# Patient Record
Sex: FEMALE | Race: ASIAN | NOT HISPANIC OR LATINO
[De-identification: names, ages, dates, MRNs, and addresses within clinical notes are randomized per-mention and may not be internally consistent; named-entity substitution may affect disease eponyms.]

---

## 2021-03-26 ENCOUNTER — APPOINTMENT (OUTPATIENT)
Dept: CARDIOLOGY | Facility: CLINIC | Age: 66
End: 2021-03-26
Payer: MEDICARE

## 2021-03-26 VITALS
RESPIRATION RATE: 18 BRPM | DIASTOLIC BLOOD PRESSURE: 68 MMHG | WEIGHT: 103 LBS | HEART RATE: 87 BPM | HEIGHT: 62 IN | BODY MASS INDEX: 18.95 KG/M2 | SYSTOLIC BLOOD PRESSURE: 107 MMHG | TEMPERATURE: 98.2 F | OXYGEN SATURATION: 96 %

## 2021-03-26 PROCEDURE — 99214 OFFICE O/P EST MOD 30 MIN: CPT | Mod: 25

## 2021-03-26 PROCEDURE — 93015 CV STRESS TEST SUPVJ I&R: CPT

## 2021-03-26 PROCEDURE — 93306 TTE W/DOPPLER COMPLETE: CPT

## 2021-03-26 NOTE — PHYSICAL EXAM
[General Appearance - Well Developed] : well developed [Normal Appearance] : normal appearance [Well Groomed] : well groomed [General Appearance - Well Nourished] : well nourished [No Deformities] : no deformities [General Appearance - In No Acute Distress] : no acute distress [Normal Conjunctiva] : the conjunctiva exhibited no abnormalities [Eyelids - No Xanthelasma] : the eyelids demonstrated no xanthelasmas [Normal Oral Mucosa] : normal oral mucosa [No Oral Pallor] : no oral pallor [No Oral Cyanosis] : no oral cyanosis [Normal Jugular Venous A Waves Present] : normal jugular venous A waves present [Normal Jugular Venous V Waves Present] : normal jugular venous V waves present [No Jugular Venous Mirza A Waves] : no jugular venous mirza A waves [Respiration, Rhythm And Depth] : normal respiratory rhythm and effort [Exaggerated Use Of Accessory Muscles For Inspiration] : no accessory muscle use [Auscultation Breath Sounds / Voice Sounds] : lungs were clear to auscultation bilaterally [Heart Rate And Rhythm] : heart rate and rhythm were normal [Heart Sounds] : normal S1 and S2 [Murmurs] : no murmurs present [Arterial Pulses Normal] : the arterial pulses were normal [Edema] : no peripheral edema present [Abdomen Soft] : soft [Abdomen Tenderness] : non-tender [Abdomen Mass (___ Cm)] : no abdominal mass palpated [Abnormal Walk] : normal gait [Gait - Sufficient For Exercise Testing] : the gait was sufficient for exercise testing [Nail Clubbing] : no clubbing of the fingernails [Cyanosis, Localized] : no localized cyanosis [Petechial Hemorrhages (___cm)] : no petechial hemorrhages [] : no ischemic changes [Oriented To Time, Place, And Person] : oriented to person, place, and time [Affect] : the affect was normal [Mood] : the mood was normal [No Anxiety] : not feeling anxious

## 2021-04-06 ENCOUNTER — NON-APPOINTMENT (OUTPATIENT)
Age: 66
End: 2021-04-06

## 2021-07-03 NOTE — DISCUSSION/SUMMARY
[FreeTextEntry1] : 59-year-old female with no significant past medical history presents for evaluation of abnormal with ECG and shortness of breath. The patient has no cardiac history. Patient was found to have an abnormal ECG on routine physical, showing Q waves anteriorly. Patient denies chest pain but reports shortness of breath with exertion. She denies palpitations or lightheadedness. She reports history of syncope when she was pregnant. She also reports that she lost 20 pounds over the past year. She was also found to have elevated serum sodium level.\par \par (1) Abnormal ECG, q waves anteriorly, SOB with exertion - Patient underwent a stress echo and completed 10 minutes of Randolph protocol.  There was no ECG or echocardiographic evidence of ischemia.  Patient was reassured.  Her ECG abnormality is likely a normal variant.\par \par (2) Abnormal weight loss - The cause of her weight loss is not clear.  She may need GI evaluation.\par \par (3) Followup - as needed.\par

## 2021-07-03 NOTE — HISTORY OF PRESENT ILLNESS
[FreeTextEntry1] : 66-year-old female with no significant past medical history presents for followup.  \par \par Patient was last seen on 6/10/14 for evaluation of abnormal with ECG and shortness of breath.  Patient underwent a stress echo and completed 10 minutes of Randolph protocol. There was no ECG or echocardiographic evidence of ischemia. Patient was reassured. Her ECG abnormality was felt to be a normal variant.\par

## 2021-07-03 NOTE — REASON FOR VISIT
[Follow-Up - Clinic] : a clinic follow-up of [Abnormal ECG] : an abnormal ECG [Dyspnea] : dyspnea [FreeTextEntry1] : 3/26/21 - Patient reports L upper CP in the mornings, not related to exertion for the last 4-5 months. Patient reports difficulty breathing and SCHMIDT..She has not have recent CXR. Patient reports burping. Patient denies h/o syncope. Patient denies palpitations. I advised patient to undergo an echocardiogram and a treadmill stress test. I advised patient to get CXR. \par \par \par 6/10/14 - The patient has no cardiac history. Patient was found to have an abnormal ECG on routine physical, showing Q waves anteriorly. Patient denies chest pain but reports shortness of breath with exertion. She denies palpitations or lightheadedness. She reports history of syncope when she was pregnant. She also reports that she lost 20 pounds over the past year. She was also found to have elevated serum sodium level.

## 2022-04-29 ENCOUNTER — APPOINTMENT (OUTPATIENT)
Dept: CARDIOLOGY | Facility: CLINIC | Age: 67
End: 2022-04-29
Payer: MEDICARE

## 2022-04-29 ENCOUNTER — NON-APPOINTMENT (OUTPATIENT)
Age: 67
End: 2022-04-29

## 2022-04-29 VITALS
OXYGEN SATURATION: 95 % | DIASTOLIC BLOOD PRESSURE: 72 MMHG | HEART RATE: 71 BPM | TEMPERATURE: 97.8 F | BODY MASS INDEX: 17.92 KG/M2 | SYSTOLIC BLOOD PRESSURE: 116 MMHG | RESPIRATION RATE: 16 BRPM | WEIGHT: 98 LBS

## 2022-04-29 DIAGNOSIS — R06.02 SHORTNESS OF BREATH: ICD-10-CM

## 2022-04-29 PROCEDURE — 93015 CV STRESS TEST SUPVJ I&R: CPT

## 2022-04-29 PROCEDURE — 93000 ELECTROCARDIOGRAM COMPLETE: CPT | Mod: 59

## 2022-04-29 PROCEDURE — ZZZZZ: CPT

## 2022-04-29 PROCEDURE — 93306 TTE W/DOPPLER COMPLETE: CPT

## 2022-04-29 PROCEDURE — 99214 OFFICE O/P EST MOD 30 MIN: CPT | Mod: 25

## 2022-04-30 PROBLEM — R06.02 SHORTNESS OF BREATH: Status: ACTIVE | Noted: 2022-04-29

## 2022-05-01 NOTE — HISTORY OF PRESENT ILLNESS
[FreeTextEntry1] : 4/29/22- Patient reports feeling palpitations with feelings of anxiousness even when she is just doing dishes. Patient did not take pulse at home. Patient reports SOB with exertion. Patient denies CP. I advised patient to undergo an echocardiogram and a treadmill stress test. I advised patient to wear a Holter monitor. \par \par 3/26/21 - Patient reports L upper CP in the mornings, not related to exertion for the last 4-5 months. Patient reports difficulty breathing and SCHMIDT..She has not have recent CXR. Patient reports burping. Patient denies h/o syncope. Patient denies palpitations. I advised patient to undergo an echocardiogram and a treadmill stress test. I advised patient to get CXR. \par \par 6/10/14 - The patient has no cardiac history. Patient was found to have an abnormal ECG on routine physical, showing Q waves anteriorly. Patient denies chest pain but reports shortness of breath with exertion. She denies palpitations or lightheadedness. She reports history of syncope when she was pregnant. She also reports that she lost 20 pounds over the past year. She was also found to have elevated serum sodium level.

## 2022-05-01 NOTE — REASON FOR VISIT
[FreeTextEntry1] : 67-year-old female with no significant past medical history presents for followup.  \par \par Patient was last seen on  3/26/21 for L upper CP in the mornings and SCHMIDT.  I advised patient to undergo an echocardiogram and a treadmill stress test. I advised patient to get CXR.  Patient underwent an echocardiogram and it showed normal LV function without significant valvular pathology.  Patient underwent a treadmill stress test and completed 9 minutes of Randolph protocol.  There was no ECG evidence of ischemia. Following treadmill stress, there was no echocardiographic evidence of ischemia. Patient underwent a CXR and it showed no lung pathology. \par  \par \par \par \par

## 2022-05-01 NOTE — CARDIOLOGY SUMMARY
[___] : [unfilled] [de-identified] : Patient underwent a stress echo 6/10/14 and completed 10 minutes of Randolph protocol. There was no ECG or echocardiographic evidence of ischemia.  \par

## 2022-05-04 ENCOUNTER — NON-APPOINTMENT (OUTPATIENT)
Age: 67
End: 2022-05-04

## 2022-05-05 ENCOUNTER — NON-APPOINTMENT (OUTPATIENT)
Age: 67
End: 2022-05-05

## 2023-03-30 ENCOUNTER — APPOINTMENT (OUTPATIENT)
Dept: CARDIOLOGY | Facility: CLINIC | Age: 68
End: 2023-03-30
Payer: MEDICARE

## 2023-03-30 ENCOUNTER — NON-APPOINTMENT (OUTPATIENT)
Age: 68
End: 2023-03-30

## 2023-03-30 VITALS
WEIGHT: 94 LBS | SYSTOLIC BLOOD PRESSURE: 123 MMHG | BODY MASS INDEX: 17.19 KG/M2 | HEART RATE: 76 BPM | RESPIRATION RATE: 16 BRPM | TEMPERATURE: 96.8 F | DIASTOLIC BLOOD PRESSURE: 78 MMHG | OXYGEN SATURATION: 97 %

## 2023-03-30 DIAGNOSIS — R07.89 OTHER CHEST PAIN: ICD-10-CM

## 2023-03-30 DIAGNOSIS — R01.1 CARDIAC MURMUR, UNSPECIFIED: ICD-10-CM

## 2023-03-30 DIAGNOSIS — F41.9 ANXIETY DISORDER, UNSPECIFIED: ICD-10-CM

## 2023-03-30 DIAGNOSIS — R00.2 PALPITATIONS: ICD-10-CM

## 2023-03-30 PROCEDURE — 99214 OFFICE O/P EST MOD 30 MIN: CPT

## 2023-03-30 PROCEDURE — 93000 ELECTROCARDIOGRAM COMPLETE: CPT

## 2023-03-30 PROCEDURE — 93306 TTE W/DOPPLER COMPLETE: CPT

## 2023-03-30 RX ORDER — PROPRANOLOL HYDROCHLORIDE 10 MG/1
10 TABLET ORAL TWICE DAILY
Qty: 60 | Refills: 1 | Status: ACTIVE | COMMUNITY
Start: 2023-03-30 | End: 1900-01-01

## 2023-03-31 NOTE — CARDIOLOGY SUMMARY
[___] : [unfilled] [de-identified] : Patient underwent a stress echo 6/10/14 and completed 10 minutes of Randolph protocol. There was no ECG or echocardiographic evidence of ischemia.  \par

## 2023-03-31 NOTE — HISTORY OF PRESENT ILLNESS
[FreeTextEntry1] : 3/30/23 - Pt is referred today for murmur. Pt reports occasional extra beats. I advised patient to undergo an echocardiogram.  I advised patient to get CTA . \par \par 4/29/22- Patient reports feeling palpitations with feelings of anxiousness even when she is just doing dishes. Patient did not take pulse at home. Patient reports SOB with exertion. Patient denies CP. I advised patient to undergo an echocardiogram and a treadmill stress test. I advised patient to wear a Holter monitor. \par \par 3/26/21 - Patient reports L upper CP in the mornings, not related to exertion for the last 4-5 months. Patient reports difficulty breathing and SCHMIDT..She has not have recent CXR. Patient reports burping. Patient denies h/o syncope. Patient denies palpitations. I advised patient to undergo an echocardiogram and a treadmill stress test. I advised patient to get CXR. \par \par 6/10/14 - The patient has no cardiac history. Patient was found to have an abnormal ECG on routine physical, showing Q waves anteriorly. Patient denies chest pain but reports shortness of breath with exertion. She denies palpitations or lightheadedness. She reports history of syncope when she was pregnant. She also reports that she lost 20 pounds over the past year. She was also found to have elevated serum sodium level.

## 2023-03-31 NOTE — REASON FOR VISIT
[FreeTextEntry1] : 67-year-old female with no significant past medical history presents for followup.  \par \par Patient was last seen on 4/29/22 for followup.  Patient reported feeling palpitations with feelings of anxiousness even when she was just doing dishes. Patient did not take pulse at home. Patient reported SOB with exertion.  I advised patient to undergo an echocardiogram and a treadmill stress test. I advised patient to wear a Holter monitor.  Patient underwent an echocardiogram and it showed normal LV function without significant valvular pathology.  Patient underwent a treadmill stress test and completed 8 minutes of Randolph protocol.  There was no ECG evidence of ischemia.  Following treadmill stress, there was no echocardiographic evidence of ischemia.  Patient wore a Holter and it showed average HR 65, 2% PACs, couplets and 1 PAT 5 beats at a rate of 140.\par \par She is on ?\par \par Patient underwent an echocardiogram 3/26/21 and it showed normal LV function without significant valvular pathology.  \par \par Patient underwent a treadmill stress test 3/26/21 and completed 9 minutes of Randolph protocol.  There was no ECG evidence of ischemia. Following treadmill stress, there was no echocardiographic evidence of ischemia. \par \par Patient underwent a CXR 3/27/21 and it showed no lung pathology. \par  \par \par \par \par